# Patient Record
Sex: MALE | Race: WHITE | Employment: UNEMPLOYED | URBAN - METROPOLITAN AREA
[De-identification: names, ages, dates, MRNs, and addresses within clinical notes are randomized per-mention and may not be internally consistent; named-entity substitution may affect disease eponyms.]

---

## 2024-02-19 ENCOUNTER — HOSPITAL ENCOUNTER (EMERGENCY)
Age: 44
Discharge: HOME | End: 2024-02-19
Payer: COMMERCIAL

## 2024-02-19 VITALS
DIASTOLIC BLOOD PRESSURE: 76 MMHG | RESPIRATION RATE: 17 BRPM | HEIGHT: 68 IN | OXYGEN SATURATION: 99 % | SYSTOLIC BLOOD PRESSURE: 110 MMHG | HEART RATE: 87 BPM | TEMPERATURE: 98.2 F | BODY MASS INDEX: 21.22 KG/M2 | WEIGHT: 140 LBS

## 2024-02-19 DIAGNOSIS — Z76.0 ENCOUNTER FOR MEDICATION REFILL: Primary | ICD-10-CM

## 2024-02-19 PROCEDURE — 99283 EMERGENCY DEPT VISIT LOW MDM: CPT

## 2024-02-19 RX ORDER — ARIPIPRAZOLE 10 MG/1
10 TABLET ORAL DAILY
COMMUNITY
End: 2024-02-19

## 2024-02-19 RX ORDER — LURASIDONE HYDROCHLORIDE 20 MG/1
20 TABLET, FILM COATED ORAL
Qty: 30 TABLET | Refills: 0 | Status: SHIPPED | OUTPATIENT
Start: 2024-02-19 | End: 2024-03-20

## 2024-02-19 RX ORDER — OLANZAPINE 10 MG/1
10 TABLET ORAL NIGHTLY
COMMUNITY
End: 2024-02-19

## 2024-02-19 RX ORDER — ARIPIPRAZOLE 10 MG/1
10 TABLET ORAL DAILY
Qty: 30 TABLET | Refills: 0 | Status: SHIPPED | OUTPATIENT
Start: 2024-02-19 | End: 2024-03-20

## 2024-02-19 RX ORDER — LURASIDONE HYDROCHLORIDE 40 MG/1
20 TABLET, FILM COATED ORAL
COMMUNITY
End: 2024-02-19

## 2024-02-19 ASSESSMENT — PAIN - FUNCTIONAL ASSESSMENT: PAIN_FUNCTIONAL_ASSESSMENT: NONE - DENIES PAIN

## 2024-02-19 NOTE — ED PROVIDER NOTES
less likely based on history and physical.  Considered sinus/suicidal ideation,.      Elie Titus is a 43 y.o. nontoxic, well-appearing male with a medical history including, but not limited to, paranoid schizophrenia, who presents to the emergency department, by his father requesting medication refill on 2 of his 3 psych meds.  Aripiprazole 10 mg and lurasidone 20 mg which are prescribed to the taken once daily.  Patient endorses that he moved from [place] back to [place] 2 months ago to be closer to his father and has been out of his medications he has no primary care provider here and just got insurance.  He states that his PCP in [place] prescribed his antipsychotics.  He denies homicidal/suicidal ideation.  Patient does present with his empty prescription bottles for the aripiprazole and lurasidone.        Patient was given the following medications:  Medications - No data to display          Is this patient to be included in the SEP-1 Core Measure due to severe sepsis or septic shock?   No   Exclusion criteria - the patient is NOT to be included for SEP-1 Core Measure due to:  Infection is not suspected    CONSULTS: (Who and What was discussed)  Discussion with Other Profesionals : None    Social Determinants : None    Records Reviewed : None    CC/HPI Summary, DDx, ED Course, and Reassessment: On reassessment patient is resting comfortably on stretcher with eyes open stable vital signs.  He will be discharged home with outpatient follow-up with the physician referral service line to establish care and with prescriptions for aripiprazole and lurasidone.    Disposition Considerations (include 1 Tests not done, Shared Decision Making, Pt Expectation of Test or Tx.):   Discharged      I am the Primary Clinician of Record.    FINAL IMPRESSION      1. Encounter for medication refill          DISPOSITION/PLAN     DISPOSITION   Discharged    PATIENT REFERRED TO:  [facility]

## 2024-02-19 NOTE — DISCHARGE INSTRUCTIONS
Return to the emergency department for new or worsening symptoms including, not limited to, developing homicidal or suicidal ideation, fever, chills, sweats, inability to tolerate food or drink, other symptoms/concerns.    Medications as prescribed.    Follow-up with the physician referral service line in order to establish a PCP for your future nonemergent medical needs and further management of your antipsychotic medications.  Call today to schedule appointment to establish care.

## 2024-02-20 ASSESSMENT — ENCOUNTER SYMPTOMS
NAUSEA: 0
COUGH: 0
EYE PAIN: 0
BACK PAIN: 0
DIARRHEA: 0
SORE THROAT: 0
ABDOMINAL PAIN: 0
VOMITING: 0
SHORTNESS OF BREATH: 0

## 2024-12-27 ENCOUNTER — HOSPITAL ENCOUNTER (EMERGENCY)
Age: 44
Discharge: HOME | End: 2024-12-27
Payer: COMMERCIAL

## 2024-12-27 VITALS
HEIGHT: 68 IN | HEART RATE: 70 BPM | RESPIRATION RATE: 16 BRPM | DIASTOLIC BLOOD PRESSURE: 84 MMHG | TEMPERATURE: 98.2 F | OXYGEN SATURATION: 100 % | BODY MASS INDEX: 23.22 KG/M2 | SYSTOLIC BLOOD PRESSURE: 127 MMHG | WEIGHT: 153.22 LBS

## 2024-12-27 DIAGNOSIS — J06.9 VIRAL URI WITH COUGH: Primary | ICD-10-CM

## 2024-12-27 LAB
FLUAV + FLUBV AG NOSE IA.RAPID: NOT DETECTED
FLUAV + FLUBV AG NOSE IA.RAPID: NOT DETECTED
SARS-COV-2 RDRP RESP QL NAA+PROBE: NOT DETECTED

## 2024-12-27 PROCEDURE — 87502 INFLUENZA DNA AMP PROBE: CPT

## 2024-12-27 PROCEDURE — 87635 SARS-COV-2 COVID-19 AMP PRB: CPT

## 2024-12-27 PROCEDURE — 6370000000 HC RX 637 (ALT 250 FOR IP): Performed by: PHYSICIAN ASSISTANT

## 2024-12-27 PROCEDURE — 99283 EMERGENCY DEPT VISIT LOW MDM: CPT

## 2024-12-27 RX ORDER — GUAIFENESIN 600 MG/1
600 TABLET, EXTENDED RELEASE ORAL 2 TIMES DAILY
Qty: 30 TABLET | Refills: 0 | Status: SHIPPED | OUTPATIENT
Start: 2024-12-27 | End: 2025-01-11

## 2024-12-27 RX ORDER — BENZONATATE 200 MG/1
200 CAPSULE ORAL 3 TIMES DAILY PRN
Qty: 30 CAPSULE | Refills: 0 | Status: SHIPPED | OUTPATIENT
Start: 2024-12-27 | End: 2025-01-06

## 2024-12-27 RX ORDER — ACETAMINOPHEN 500 MG
1000 TABLET ORAL ONCE
Status: COMPLETED | OUTPATIENT
Start: 2024-12-27 | End: 2024-12-27

## 2024-12-27 RX ORDER — FLUTICASONE PROPIONATE 50 MCG
2 SPRAY, SUSPENSION (ML) NASAL DAILY
Qty: 16 G | Refills: 0 | Status: SHIPPED | OUTPATIENT
Start: 2024-12-27

## 2024-12-27 RX ADMIN — ACETAMINOPHEN 1000 MG: 500 TABLET ORAL at 08:16

## 2024-12-27 ASSESSMENT — PAIN SCALES - GENERAL
PAINLEVEL_OUTOF10: 0
PAINLEVEL_OUTOF10: 0

## 2024-12-27 ASSESSMENT — PAIN - FUNCTIONAL ASSESSMENT
PAIN_FUNCTIONAL_ASSESSMENT: NONE - DENIES PAIN
PAIN_FUNCTIONAL_ASSESSMENT: ACTIVITIES ARE NOT PREVENTED
PAIN_FUNCTIONAL_ASSESSMENT: ACTIVITIES ARE NOT PREVENTED

## 2024-12-27 ASSESSMENT — LIFESTYLE VARIABLES
HOW OFTEN DO YOU HAVE A DRINK CONTAINING ALCOHOL: NEVER
HOW MANY STANDARD DRINKS CONTAINING ALCOHOL DO YOU HAVE ON A TYPICAL DAY: PATIENT DOES NOT DRINK

## 2024-12-27 NOTE — ED TRIAGE NOTES
Pt comes to ED with c/o runny nose, sore throat, cough, and fatigue that started on Monday. Pt sts symptoms are getting worse.

## 2024-12-27 NOTE — ED PROVIDER NOTES
**ADVANCED PRACTICE PROVIDER, I HAVE EVALUATED THIS PATIENT**        [facility] EMERGENCY DEPARTMENT  EMERGENCY DEPARTMENT ENCOUNTER      Pt Name: Elie Titus  MRN:[id number]  Birthdate 1980  Date of evaluation: 12/27/2024  Provider: Kevin Mack PA-C  Note Started: 7:10 AM EST 12/27/24        Chief Complaint:    Chief Complaint   Patient presents with    Fatigue    Cough    Pharyngitis    Nasal Congestion         Nursing Notes, Past Medical Hx, Past Surgical Hx, Social Hx, Allergies, and Family Hx were all reviewed and agreed with or any disagreements were addressed in the HPI.    HPI: (Location, Duration, Timing, Severity, Quality, Assoc Sx, Context, Modifying factors)    History From: patient          Chief Complaint of ill feeling since Monday    This is a  44 y.o. male who presents stating that his presenting issue is the cough and congestion and not feeling well this week.  Symptoms seem to be feeling a bit worse as the week goes on.  He indicates he thinks that he got this from his child who has been sick.  States that he started having runny and stuffy nose, sore throat, cough and congestion.  Sometimes if he coughs really hard he will throw up afterwards.  Feels tired and sometimes a bit sweaty.  This waxes and wanes.  Ibuprofen has not helped so far.  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  Patient mentions that he does have ongoing issues with blood in the stool but intends to get an outpatient colonoscopy; denies any acute need or emergent change in symptoms as of recent.  No syncope or near syncope shortness of breath or chest pain or extremity acute change.    PastMedical/Surgical History:  No past medical history on file.  No past surgical history on file.    Medications:  Previous Medications    ARIPIPRAZOLE (ABILIFY) 10 MG TABLET    Take 1 tablet by mouth daily    LURASIDONE

## 2024-12-27 NOTE — ED NOTES

## 2024-12-27 NOTE — DISCHARGE INSTRUCTIONS
Home stable condition to drink plenty of water, elevate head and shoulders at rest, use medications as written, and monitor for gradual improvement with time.  Medications are meant to help with symptoms but this infection is viral and will run its own course.  Consider follow-up with family doctor in about 7 to 10 days for recheck and further care and treatment if needed.  Return to the ER for any emergency worsening or concern.